# Patient Record
(demographics unavailable — no encounter records)

---

## 2025-03-03 NOTE — PHYSICAL EXAM
[No Acute Distress] : no acute distress [Well Nourished] : well nourished [Well Developed] : well developed [Well-Appearing] : well-appearing [EOMI] : extraocular movements intact [No Respiratory Distress] : no respiratory distress  [No Accessory Muscle Use] : no accessory muscle use [Clear to Auscultation] : lungs were clear to auscultation bilaterally [Normal Rate] : normal rate  [Regular Rhythm] : with a regular rhythm [Normal S1, S2] : normal S1 and S2 [Soft] : abdomen soft [Non Tender] : non-tender [Non-distended] : non-distended [Normal Bowel Sounds] : normal bowel sounds [No Joint Swelling] : no joint swelling [No Focal Deficits] : no focal deficits [Alert and Oriented x3] : oriented to person, place, and time [de-identified] : red scaly plaques on BL elbows and knees

## 2025-03-03 NOTE — HISTORY OF PRESENT ILLNESS
[de-identified] : Patient is a 54 yo male with PMHx of eczema presenting today to establish care.  Patient states he has appt scheduled with gastro for abdominal bloating and patient will schedule for colonoscopy as well.  Patient follows with dermatology for eczema.  Of note, patient has R knee pain which started a couple of months ago. Patient drives a lot and has stiffness and pain when he stands up after driving for a while. Patient requesting referral for ortho.  Dentist- every 6 months.  Ophtho- every 6 months. Flu shot- declined  Shingles vaccine- will schedule at pharmacy

## 2025-03-03 NOTE — HEALTH RISK ASSESSMENT
[Good] : ~his/her~  mood as  good [2 - 4 times a month (2 pts)] : 2-4 times a month (2 points) [1 or 2 (0 pts)] : 1 or 2 (0 points) [No falls in past year] : Patient reported no falls in the past year [0] : 2) Feeling down, depressed, or hopeless: Not at all (0) [PHQ-2 Negative - No further assessment needed] : PHQ-2 Negative - No further assessment needed [Current] : Current [0-4] : 0-4 [NO] : No [With Family] : lives with family [Employed] : employed [Fully functional (bathing, dressing, toileting, transferring, walking, feeding)] : Fully functional (bathing, dressing, toileting, transferring, walking, feeding) [Fully functional (using the telephone, shopping, preparing meals, housekeeping, doing laundry, using] : Fully functional and needs no help or supervision to perform IADLs (using the telephone, shopping, preparing meals, housekeeping, doing laundry, using transportation, managing medications and managing finances) [Smoke Detector] : smoke detector [Carbon Monoxide Detector] : carbon monoxide detector [[id number]] : 0 [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

## 2025-03-03 NOTE — PLAN
[FreeTextEntry1] : Patient is a 54 yo male with PMHx of eczema presenting today to establish care.  CPE - Reviewed blood work with patient.  - EKG- normal sinus  - Dentist- every 6 months. - Ophtho- every 6 months. - Flu shot- declined - Will schedule for shingles vaccine at pharmacy   HLD, Pre- diabetes  - Discussed with patient regarding dietary modifications and recommended patient to decrease carbohydrate intake and consume less fried foods and to increase daily steps/exercise  - Patient to attempt at modifications and f/u in 3-4 months for repeat blood work  Abdominal bloating  - Has appt with GI scheduled  - Counselled patient on colon cancer screening, patient will schedule colonoscopy   Eczema  - Chronic  - Patient follows with dermatology for eczema. - Derm requested blood work- ordered   R knee pain  - R knee pain which started a couple of months ago. Patient drives a lot and has stiffness and pain when he stands up after driving for a while.  - Ortho referral

## 2025-03-03 NOTE — REVIEW OF SYSTEMS
[Abdominal Pain] : abdominal pain [Heartburn] : heartburn [Joint Pain] : joint pain [Joint Stiffness] : joint stiffness [Negative] : Heme/Lymph

## 2025-03-06 NOTE — HISTORY OF PRESENT ILLNESS
[FreeTextEntry1] : He is a 53-year-old male with a 2-month history of early morning mid epigastric abdominal pressure associated with abdominal bloating throughout the day.  He denies constipation, diarrhea or rectal bleeding.  He denies NSAID use.  He denies heartburn or chest pain.  He has never had a screening colonoscopy.  He denies a family history of colon cancer

## 2025-03-06 NOTE — ASSESSMENT
[FreeTextEntry1] : ROMMEL KUNZ was advised to undergo endoscopy to which he agreed. The procedure will be performed in [facility] with the assistance of an anesthesiologist. He was given a booklet distributed by the American Society of Gastrointestinal Endoscopy explaining the procedure in detail and he understood the risks of the procedure not limited to infection, bleeding, perforation or non- diagnosis of gastric or esophageal cancer.  He was advised that he could not drive home, if he chooses to receive sedation. Further diagnostic and treatment recommendations will be based upon the procedure and any biopsies, if they are taken. Thank you for allowing me to participate in this patients health care.  ROMMEL KUNZ was advised to undergo colonoscopy to which he agreed. The procedure will be performed in [facility] with the assistance of an anesthesiologist. The patient was given a Suprep preparation prescription and understood the  procedure as it was explained to his. He was given a booklet distributed by the American Society of Gastrointestinal  Endoscopy explaining the procedure in detail and he understood the risks of the procedure not limited to infection, bleeding, perforation or non- diagnosis of colorectal cancer. He was advised that he could not drive home, if he chooses to  receive sedation.  Further diagnostic and treatment recommendations will be based upon the procedure and any biopsies, if they are taken.  Thank you for allowing me to participate in this patients health care.  , Best personal regards -- Don  I spent 46 minutes with the patient and answered all of his questions including explaining endoscopy and colonoscopy in detail

## 2025-03-06 NOTE — ASSESSMENT
[FreeTextEntry1] : ROMMEL KUNZ was advised to undergo endoscopy to which he agreed. The procedure will be performed in [facility] with the assistance of an anesthesiologist. He was given a booklet distributed by the American Society of Gastrointestinal Endoscopy explaining the procedure in detail and he understood the risks of the procedure not limited to infection, bleeding, perforation or non- diagnosis of gastric or esophageal cancer.  He was advised that he could not drive home, if he chooses to receive sedation. Further diagnostic and treatment recommendations will be based upon the procedure and any biopsies, if they are taken. Thank you for allowing me to participate in this patients health care.  ROMMEL KUNZ was advised to undergo colonoscopy to which he agreed. The procedure will be performed in [facility] with the assistance of an anesthesiologist. The patient was given a Suprep preparation prescription and understood the  procedure as it was explained to his. He was given a booklet distributed by the American Society of Gastrointestinal  Endoscopy explaining the procedure in detail and he understood the risks of the procedure not limited to infection, bleeding, perforation or non- diagnosis of colorectal cancer. He was advised that he could not drive home, if he chooses to  receive sedation.  Further diagnostic and treatment recommendations will be based upon the procedure and any biopsies, if they are taken.  Thank you for allowing me to participate in this patients health care.  , Best personal regards -- Don  I spent 46 minutes with the patient and answered all of his questions including explaining endoscopy and colonoscopy in detail

## 2025-03-06 NOTE — CONSULT LETTER
[Dear  ___] : Dear  [unfilled], [Consult Letter:] : I had the pleasure of evaluating your patient, [unfilled]. [( Thank you for referring [unfilled] for consultation for _____ )] : Thank you for referring [unfilled] for consultation for [unfilled] [Please see my note below.] : Please see my note below. [Consult Closing:] : Thank you very much for allowing me to participate in the care of this patient.  If you have any questions, please do not hesitate to contact me. [Sincerely,] : Sincerely, [FreeTextEntry3] : Brandon Baptiste MD  Gastroenterology [facility]

## 2025-03-06 NOTE — CONSULT LETTER
[Dear  ___] : Dear  [unfilled], [Consult Letter:] : I had the pleasure of evaluating your patient, [unfilled]. [( Thank you for referring [unfilled] for consultation for _____ )] : Thank you for referring [unfilled] for consultation for [unfilled] [Please see my note below.] : Please see my note below. [Consult Closing:] : Thank you very much for allowing me to participate in the care of this patient.  If you have any questions, please do not hesitate to contact me. [Sincerely,] : Sincerely, [FreeTextEntry3] : Brandon Baptiste MD  Gastroenterology [facility]

## 2025-03-07 NOTE — PHYSICAL EXAM
[de-identified] : Constitutional:  53 year old male, alert and oriented, cooperative, in no acute distress.  HEENT  NC/AT.  Appearance: symmetric  Neck/Back Straight without deformity or instability.  Good ROM.  Chest/Respiratory  Respiratory effort: no intercostal retractions or use of accessory muscles. Nonlabored Breathing  Skin  On inspection, warm and dry without rashes or lesions.  Mental Status:  Judgment, insight: intact Orientation: oriented to time, place, and person  Neurological: Sensory and Motor are grossly intact throughout  Right Knee  Inspection:     Skin intact, no rashes or lesions     No Effusion     Non-tender to palpation over tibial tubercle, patella, medial and lateral joint line, and pes insertion.  Range of Motion: 	Extension - 0 degrees 	Flexion - 120 degrees 	Extensor lag: None  Stability:      Demonstrates no Varus or Valgus instability      Negative Anterior or Posterior drawer.      Negative Lachman's  Patella: stable, tracks well.   Neurologic Exam     Motor intact including 5/5 Extensor Hallucis Longus, 5/5 Flexor Hallucis Longus, 5/5 Tibialis Anterior and 5/5 Gastrocnemius     Sensation Intact to Light Touch including Saphenous, Sural, Superficial Peroneal, Deep Peroneal, Tibial nerve distributions  Vascular Exam     Foot is warm and well perfused with 2+ Dorsalis Pedis Pulse   No pain with range of motion of the bilateral hips or left knee. No lumbar paraspinal muscle tenderness. [de-identified] : XRay:  XRays of the Right Knee (4 Views) taken in the office today and discussed with the patient. XRays demonstrate no obvious fracture or dislocation. There is no significant evidence of osteoarthritis or osteophyte formation. (my personal interpretation).

## 2025-03-07 NOTE — PHYSICAL EXAM
[de-identified] : Constitutional:  53 year old male, alert and oriented, cooperative, in no acute distress.  HEENT  NC/AT.  Appearance: symmetric  Neck/Back Straight without deformity or instability.  Good ROM.  Chest/Respiratory  Respiratory effort: no intercostal retractions or use of accessory muscles. Nonlabored Breathing  Skin  On inspection, warm and dry without rashes or lesions.  Mental Status:  Judgment, insight: intact Orientation: oriented to time, place, and person  Neurological: Sensory and Motor are grossly intact throughout  Right Knee  Inspection:     Skin intact, no rashes or lesions     No Effusion     Non-tender to palpation over tibial tubercle, patella, medial and lateral joint line, and pes insertion.  Range of Motion: 	Extension - 0 degrees 	Flexion - 120 degrees 	Extensor lag: None  Stability:      Demonstrates no Varus or Valgus instability      Negative Anterior or Posterior drawer.      Negative Lachman's  Patella: stable, tracks well.   Neurologic Exam     Motor intact including 5/5 Extensor Hallucis Longus, 5/5 Flexor Hallucis Longus, 5/5 Tibialis Anterior and 5/5 Gastrocnemius     Sensation Intact to Light Touch including Saphenous, Sural, Superficial Peroneal, Deep Peroneal, Tibial nerve distributions  Vascular Exam     Foot is warm and well perfused with 2+ Dorsalis Pedis Pulse   No pain with range of motion of the bilateral hips or left knee. No lumbar paraspinal muscle tenderness. [de-identified] : XRay:  XRays of the Right Knee (4 Views) taken in the office today and discussed with the patient. XRays demonstrate no obvious fracture or dislocation. There is no significant evidence of osteoarthritis or osteophyte formation. (my personal interpretation).

## 2025-03-07 NOTE — HISTORY OF PRESENT ILLNESS
[de-identified] : Edvin Butler is a 53-year-old male who presented to the office for evaluation of his right knee pain.  Patient has been experiencing right knee pain for about 2 months.  Pain is located over the posterior knee with knee flexion.  No falls.  No fevers or chills.  He has not tried physical therapy, injections, or pain medications.  History: HLD

## 2025-03-07 NOTE — DISCUSSION/SUMMARY
[de-identified] : Edvin Butler is a 53-year-old male who presents to the office for evaluation of his right knee pain.  X-rays showed no obvious fractures or dislocations.  Examination showed good right knee range of motion. Discussed with the patient the examination and imaging findings.  Discussed with the patient the management of patient's hamstring pain at this time, including physical therapy and anti-inflammatories.  Patient was given referral for physical therapy.  Patient will take over-the-counter anti-inflammatories as needed for pain control.  Patient will follow-up in 2 months for reevaluation and management.  Patient understanding and in agreement the plan.  All questions answered   Plan: -Physical Therapy -Over-the-counter anti-inflammatories as needed for pain control -Follow up in 2 months for reevaluation and management

## 2025-03-07 NOTE — HISTORY OF PRESENT ILLNESS
[de-identified] : Edvin Butler is a 53-year-old male who presented to the office for evaluation of his right knee pain.  Patient has been experiencing right knee pain for about 2 months.  Pain is located over the posterior knee with knee flexion.  No falls.  No fevers or chills.  He has not tried physical therapy, injections, or pain medications.  History: HLD

## 2025-05-14 NOTE — CONSULT LETTER
[Dear  ___] : Dear  [unfilled], [Consult Letter:] : I had the pleasure of evaluating your patient, [unfilled]. [( Thank you for referring [unfilled] for consultation for _____ )] : Thank you for referring [unfilled] for consultation for [unfilled] [Please see my note below.] : Please see my note below. [Consult Closing:] : Thank you very much for allowing me to participate in the care of this patient.  If you have any questions, please do not hesitate to contact me. [Sincerely,] : Sincerely, [FreeTextEntry3] : Brandon Baptiste MD  Gastroenterology [facility]

## 2025-05-14 NOTE — ASSESSMENT
[FreeTextEntry1] : Continue medications Office follow-up in 3 to 4 months  If feeling well, will start to taper and reduce his medications at that time   I spent 25 minutes with the patient and reviewed both his colonoscopy and endoscopy and reviewed tapering his medications in the future and.completed his medical record

## 2025-05-14 NOTE — HISTORY OF PRESENT ILLNESS
[FreeTextEntry1] : His recent colonoscopy was normal.  His recent endoscopy revealed normal mucosa throughout with a small hiatal hernia.  He was placed on pantoprazole 40 mg in the morning and famotidine 40 mg at night which suppresses his heartburn. He  also was placed on hyoscyamine 0.375 mg twice a day which eliminated his abdominal pain and bloating.  He is feeling much better with his regiment and has no complaints.  Routine biopsies of his stomach were negative for Helicobacter pylori.

## 2025-06-22 NOTE — PHYSICAL EXAM
[de-identified] : Constitutional:  53 year old male, alert and oriented, cooperative, in no acute distress.  HEENT  NC/AT.  Appearance: symmetric  Neck/Back Straight without deformity or instability.  Good ROM.  Chest/Respiratory  Respiratory effort: no intercostal retractions or use of accessory muscles. Nonlabored Breathing  Skin  On inspection, warm and dry without rashes or lesions.  Mental Status:  Judgment, insight: intact Orientation: oriented to time, place, and person  Neurological: Sensory and Motor are grossly intact throughout  Right Knee  Inspection:     Skin intact, no rashes or lesions     No Effusion     Non-tender to palpation over tibial tubercle, patella, medial and lateral joint line, and pes insertion.  Range of Motion: 	Extension - 0 degrees 	Flexion - 120 degrees 	Extensor lag: None  Stability:      Demonstrates no Varus or Valgus instability      Negative Anterior or Posterior drawer.      Negative Lachman's  Patella: stable, tracks well.   Neurologic Exam     Motor intact including 5/5 Extensor Hallucis Longus, 5/5 Flexor Hallucis Longus, 5/5 Tibialis Anterior and 5/5 Gastrocnemius     Sensation Intact to Light Touch including Saphenous, Sural, Superficial Peroneal, Deep Peroneal, Tibial nerve distributions  Vascular Exam     Foot is warm and well perfused with 2+ Dorsalis Pedis Pulse   No pain with range of motion of the bilateral hips or left knee. No lumbar paraspinal muscle tenderness. [de-identified] : XRay:  XRays of the Right Knee (4 Views) taken on 3/7/2025. XRays demonstrate no obvious fracture or dislocation. There is no significant evidence of osteoarthritis or osteophyte formation. (my personal interpretation).

## 2025-06-22 NOTE — HISTORY OF PRESENT ILLNESS
[de-identified] : 6/19/2025  3/7/2025 Edvin Butler is a 53-year-old male who presented to the office for evaluation of his right knee pain.  Patient has been experiencing right knee pain for about 2 months.  Pain is located over the posterior knee with knee flexion.  No falls.  No fevers or chills.  He has not tried physical therapy, injections, or pain medications.  History: HLD

## 2025-06-22 NOTE — DISCUSSION/SUMMARY
[de-identified] : Edvin Butler is a 53-year-old male who presents to the office for evaluation of his right knee pain.  X-rays showed no obvious fractures or dislocations.  Examination showed good right knee range of motion. Discussed with the patient the examination and imaging findings.  Discussed with the patient the management of patient's hamstring pain at this time, including physical therapy and anti-inflammatories.  Patient was given referral for physical therapy.  Patient will take over-the-counter anti-inflammatories as needed for pain control.  Patient will follow-up in 2 months for reevaluation and management.  Patient understanding and in agreement the plan.  All questions answered   Plan: -Physical Therapy -Over-the-counter anti-inflammatories as needed for pain control -Follow up in 2 months for reevaluation and management

## 2025-07-17 NOTE — HISTORY OF PRESENT ILLNESS
[de-identified] : Patient is a 54 yo male presenting for follow up and repeat blood work.  Patient with elevated cholesterol levels and A1c on previous blood work.  States he has been modifying his diet and eating less sugar and carbs. States he built an at home gym and has been exercising more often.  Denies any further acute complaints.  All other ROS negative.  Maria M

## 2025-07-17 NOTE — PLAN
[FreeTextEntry1] : Patient is a 52 yo male presenting for follow up and repeat blood work.  HLD, Pre- diabetes - Patient states he has been modifying his diet and eating less sugar and carbs. States he built an at home gym and has been exercising more often. - Ordered repeat blood work, will f/u with results

## 2025-07-17 NOTE — PHYSICAL EXAM
[No Acute Distress] : no acute distress [Well Nourished] : well nourished [Well Developed] : well developed [Well-Appearing] : well-appearing [EOMI] : extraocular movements intact [No Respiratory Distress] : no respiratory distress  [No Accessory Muscle Use] : no accessory muscle use [Clear to Auscultation] : lungs were clear to auscultation bilaterally [Normal Rate] : normal rate  [Regular Rhythm] : with a regular rhythm [Normal S1, S2] : normal S1 and S2 [No Joint Swelling] : no joint swelling [No Rash] : no rash [No Focal Deficits] : no focal deficits [Alert and Oriented x3] : oriented to person, place, and time